# Patient Record
Sex: MALE | Race: ASIAN | NOT HISPANIC OR LATINO | Employment: FULL TIME | URBAN - METROPOLITAN AREA
[De-identification: names, ages, dates, MRNs, and addresses within clinical notes are randomized per-mention and may not be internally consistent; named-entity substitution may affect disease eponyms.]

---

## 2017-04-10 ENCOUNTER — APPOINTMENT (OUTPATIENT)
Dept: ULTRASOUND IMAGING | Facility: CLINIC | Age: 52
End: 2017-04-10
Attending: EMERGENCY MEDICINE
Payer: COMMERCIAL

## 2017-04-10 ENCOUNTER — APPOINTMENT (OUTPATIENT)
Dept: CT IMAGING | Facility: CLINIC | Age: 52
End: 2017-04-10
Attending: EMERGENCY MEDICINE
Payer: COMMERCIAL

## 2017-04-10 ENCOUNTER — HOSPITAL ENCOUNTER (EMERGENCY)
Facility: CLINIC | Age: 52
Discharge: HOME | End: 2017-04-10
Attending: EMERGENCY MEDICINE | Admitting: EMERGENCY MEDICINE
Payer: COMMERCIAL

## 2017-04-10 VITALS
RESPIRATION RATE: 16 BRPM | DIASTOLIC BLOOD PRESSURE: 95 MMHG | HEART RATE: 85 BPM | OXYGEN SATURATION: 95 % | SYSTOLIC BLOOD PRESSURE: 142 MMHG | TEMPERATURE: 98.4 F

## 2017-04-10 DIAGNOSIS — R10.13 ABDOMINAL PAIN, EPIGASTRIC: ICD-10-CM

## 2017-04-10 DIAGNOSIS — F10.10 ALCOHOL CONSUMPTION BINGE DRINKING: ICD-10-CM

## 2017-04-10 DIAGNOSIS — R11.2 NON-INTRACTABLE VOMITING WITH NAUSEA, UNSPECIFIED VOMITING TYPE: ICD-10-CM

## 2017-04-10 DIAGNOSIS — R31.29 MICROSCOPIC HEMATURIA: ICD-10-CM

## 2017-04-10 LAB
ALBUMIN SERPL-MCNC: 3.9 G/DL (ref 3.4–5)
ALBUMIN UR-MCNC: NEGATIVE MG/DL
ALP SERPL-CCNC: 81 U/L (ref 40–150)
ALT SERPL W P-5'-P-CCNC: 72 U/L (ref 0–70)
ANION GAP SERPL CALCULATED.3IONS-SCNC: 10 MMOL/L (ref 3–14)
APPEARANCE UR: CLEAR
AST SERPL W P-5'-P-CCNC: 34 U/L (ref 0–45)
BACTERIA #/AREA URNS HPF: ABNORMAL /HPF
BASOPHILS # BLD AUTO: 0 10E9/L (ref 0–0.2)
BASOPHILS NFR BLD AUTO: 0.2 %
BILIRUB SERPL-MCNC: 0.5 MG/DL (ref 0.2–1.3)
BILIRUB UR QL STRIP: NEGATIVE
BUN SERPL-MCNC: 21 MG/DL (ref 7–30)
CALCIUM SERPL-MCNC: 8 MG/DL (ref 8.5–10.1)
CHLORIDE SERPL-SCNC: 113 MMOL/L (ref 94–109)
CO2 SERPL-SCNC: 20 MMOL/L (ref 20–32)
COLOR UR AUTO: COLORLESS
CREAT SERPL-MCNC: 1.23 MG/DL (ref 0.66–1.25)
DIFFERENTIAL METHOD BLD: ABNORMAL
EOSINOPHIL # BLD AUTO: 0.1 10E9/L (ref 0–0.7)
EOSINOPHIL NFR BLD AUTO: 0.4 %
ERYTHROCYTE [DISTWIDTH] IN BLOOD BY AUTOMATED COUNT: 13 % (ref 10–15)
GFR SERPL CREATININE-BSD FRML MDRD: 62 ML/MIN/1.7M2
GLUCOSE SERPL-MCNC: 153 MG/DL (ref 70–99)
GLUCOSE UR STRIP-MCNC: NEGATIVE MG/DL
HCT VFR BLD AUTO: 44.8 % (ref 40–53)
HGB BLD-MCNC: 15.1 G/DL (ref 13.3–17.7)
HGB UR QL STRIP: ABNORMAL
IMM GRANULOCYTES # BLD: 0.1 10E9/L (ref 0–0.4)
IMM GRANULOCYTES NFR BLD: 0.3 %
INTERPRETATION ECG - MUSE: NORMAL
KETONES UR STRIP-MCNC: NEGATIVE MG/DL
LACTATE BLD-SCNC: 1.8 MMOL/L (ref 0.7–2.1)
LEUKOCYTE ESTERASE UR QL STRIP: NEGATIVE
LIPASE SERPL-CCNC: 157 U/L (ref 73–393)
LYMPHOCYTES # BLD AUTO: 1.8 10E9/L (ref 0.8–5.3)
LYMPHOCYTES NFR BLD AUTO: 12.4 %
MCH RBC QN AUTO: 31.3 PG (ref 26.5–33)
MCHC RBC AUTO-ENTMCNC: 33.7 G/DL (ref 31.5–36.5)
MCV RBC AUTO: 93 FL (ref 78–100)
MONOCYTES # BLD AUTO: 1.2 10E9/L (ref 0–1.3)
MONOCYTES NFR BLD AUTO: 7.9 %
MUCOUS THREADS #/AREA URNS LPF: PRESENT /LPF
NEUTROPHILS # BLD AUTO: 11.4 10E9/L (ref 1.6–8.3)
NEUTROPHILS NFR BLD AUTO: 78.8 %
NITRATE UR QL: NEGATIVE
NRBC # BLD AUTO: 0 10*3/UL
NRBC BLD AUTO-RTO: 0 /100
PH UR STRIP: 5 PH (ref 5–7)
PLATELET # BLD AUTO: 122 10E9/L (ref 150–450)
POTASSIUM SERPL-SCNC: 4 MMOL/L (ref 3.4–5.3)
PROT SERPL-MCNC: 6.9 G/DL (ref 6.8–8.8)
RBC # BLD AUTO: 4.82 10E12/L (ref 4.4–5.9)
RBC #/AREA URNS AUTO: 11 /HPF (ref 0–2)
SODIUM SERPL-SCNC: 143 MMOL/L (ref 133–144)
SP GR UR STRIP: 1.02 (ref 1–1.03)
URN SPEC COLLECT METH UR: ABNORMAL
UROBILINOGEN UR STRIP-MCNC: 0 MG/DL (ref 0–2)
WBC # BLD AUTO: 14.5 10E9/L (ref 4–11)
WBC #/AREA URNS AUTO: 1 /HPF (ref 0–2)

## 2017-04-10 PROCEDURE — 96375 TX/PRO/DX INJ NEW DRUG ADDON: CPT

## 2017-04-10 PROCEDURE — 25500064 ZZH RX 255 OP 636: Performed by: EMERGENCY MEDICINE

## 2017-04-10 PROCEDURE — 96374 THER/PROPH/DIAG INJ IV PUSH: CPT | Mod: 59

## 2017-04-10 PROCEDURE — 96376 TX/PRO/DX INJ SAME DRUG ADON: CPT

## 2017-04-10 PROCEDURE — 80053 COMPREHEN METABOLIC PANEL: CPT | Performed by: EMERGENCY MEDICINE

## 2017-04-10 PROCEDURE — 83690 ASSAY OF LIPASE: CPT | Performed by: EMERGENCY MEDICINE

## 2017-04-10 PROCEDURE — 36415 COLL VENOUS BLD VENIPUNCTURE: CPT | Performed by: EMERGENCY MEDICINE

## 2017-04-10 PROCEDURE — 83605 ASSAY OF LACTIC ACID: CPT | Performed by: EMERGENCY MEDICINE

## 2017-04-10 PROCEDURE — 96361 HYDRATE IV INFUSION ADD-ON: CPT

## 2017-04-10 PROCEDURE — 85025 COMPLETE CBC W/AUTO DIFF WBC: CPT | Performed by: EMERGENCY MEDICINE

## 2017-04-10 PROCEDURE — 81001 URINALYSIS AUTO W/SCOPE: CPT | Performed by: EMERGENCY MEDICINE

## 2017-04-10 PROCEDURE — 74177 CT ABD & PELVIS W/CONTRAST: CPT

## 2017-04-10 PROCEDURE — 99285 EMERGENCY DEPT VISIT HI MDM: CPT | Mod: 25

## 2017-04-10 PROCEDURE — 25000128 H RX IP 250 OP 636: Performed by: EMERGENCY MEDICINE

## 2017-04-10 PROCEDURE — 76705 ECHO EXAM OF ABDOMEN: CPT

## 2017-04-10 RX ORDER — LIDOCAINE 40 MG/G
CREAM TOPICAL
Status: DISCONTINUED | OUTPATIENT
Start: 2017-04-10 | End: 2017-04-10 | Stop reason: HOSPADM

## 2017-04-10 RX ORDER — IOPAMIDOL 755 MG/ML
500 INJECTION, SOLUTION INTRAVASCULAR ONCE
Status: COMPLETED | OUTPATIENT
Start: 2017-04-10 | End: 2017-04-10

## 2017-04-10 RX ORDER — ONDANSETRON 2 MG/ML
4 INJECTION INTRAMUSCULAR; INTRAVENOUS EVERY 30 MIN PRN
Status: DISCONTINUED | OUTPATIENT
Start: 2017-04-10 | End: 2017-04-10 | Stop reason: HOSPADM

## 2017-04-10 RX ORDER — SODIUM CHLORIDE 9 MG/ML
1000 INJECTION, SOLUTION INTRAVENOUS CONTINUOUS
Status: DISCONTINUED | OUTPATIENT
Start: 2017-04-10 | End: 2017-04-10 | Stop reason: HOSPADM

## 2017-04-10 RX ORDER — ONDANSETRON 4 MG/1
4 TABLET, ORALLY DISINTEGRATING ORAL EVERY 8 HOURS PRN
Qty: 10 TABLET | Refills: 0 | Status: SHIPPED | OUTPATIENT
Start: 2017-04-10 | End: 2017-04-13

## 2017-04-10 RX ORDER — HYDROMORPHONE HYDROCHLORIDE 1 MG/ML
0.5 INJECTION, SOLUTION INTRAMUSCULAR; INTRAVENOUS; SUBCUTANEOUS
Status: DISCONTINUED | OUTPATIENT
Start: 2017-04-10 | End: 2017-04-10 | Stop reason: HOSPADM

## 2017-04-10 RX ORDER — HYDROCODONE BITARTRATE AND ACETAMINOPHEN 5; 325 MG/1; MG/1
1-2 TABLET ORAL EVERY 4 HOURS PRN
Qty: 15 TABLET | Refills: 0 | Status: SHIPPED | OUTPATIENT
Start: 2017-04-10

## 2017-04-10 RX ADMIN — SODIUM CHLORIDE 1000 ML: 9 INJECTION, SOLUTION INTRAVENOUS at 12:50

## 2017-04-10 RX ADMIN — SODIUM CHLORIDE 63 ML: 9 INJECTION, SOLUTION INTRAVENOUS at 15:37

## 2017-04-10 RX ADMIN — IOPAMIDOL 93 ML: 755 INJECTION, SOLUTION INTRAVENOUS at 15:37

## 2017-04-10 RX ADMIN — HYDROMORPHONE HYDROCHLORIDE 0.5 MG: 1 INJECTION, SOLUTION INTRAMUSCULAR; INTRAVENOUS; SUBCUTANEOUS at 15:57

## 2017-04-10 RX ADMIN — HYDROMORPHONE HYDROCHLORIDE 0.5 MG: 1 INJECTION, SOLUTION INTRAMUSCULAR; INTRAVENOUS; SUBCUTANEOUS at 12:50

## 2017-04-10 RX ADMIN — ONDANSETRON 4 MG: 2 INJECTION INTRAMUSCULAR; INTRAVENOUS at 12:51

## 2017-04-10 ASSESSMENT — ENCOUNTER SYMPTOMS
DIARRHEA: 1
ABDOMINAL PAIN: 1
NAUSEA: 1
SHORTNESS OF BREATH: 0
VOMITING: 0
BLOOD IN STOOL: 0

## 2017-04-10 NOTE — LETTER
[facility] EMERGENCY DEPARTMENT  [address]  [facility] [address]  [phone]    April 10, 2017    Ziggy Lauren Sr.  [id number] [address] [facility] [address]  [phone] (home) none (work)    : 1965      To Whom it may concern:    Ziggy Lauren was seen in our Emergency Department today, April 10, 2017.  I expect his condition to improve over the next 1-2 days.  He may return to work/school when improved.    Sincerely,        Belkis Greco

## 2017-04-10 NOTE — ED AVS SNAPSHOT
[facility] Emergency Department    [address]    [facility] [address]    Phone:  [phone]    Fax:  [phone]                                       Ziggy Lauren Sr.   MRN: [id number]    Department:  [facility] Emergency Department   Date of Visit:  4/10/2017           After Visit Summary Signature Page     I have received my discharge instructions, and my questions have been answered. I have discussed any challenges I see with this plan with the nurse or doctor.    ..........................................................................................................................................  Patient/Patient Representative Signature      ..........................................................................................................................................  Patient Representative Print Name and Relationship to Patient    ..................................................               ................................................  Date                                            Time    ..........................................................................................................................................  Reviewed by Signature/Title    ...................................................              ..............................................  Date                                                            Time

## 2017-04-10 NOTE — ED PROVIDER NOTES
History     Chief Complaint:  Abdominal Pain      ROB Lauren Sr. is a 52 year old male who presents with abdominal pain. The patient reports this afternoon the patient developed upper abdominal pain which progressively worsened after eating corn beef followed by nausea and non bloody/black vomiting. He was concerned given his symptoms which prompts his visit. No history of gastritis, pancreatitis, or The patient denies unusual or change in diet. Patient denies black/bloody stool. He has had some diarrhea today. No chest pain, shortness of breath, or any associated symptoms. Patient drinks heavily on the weekends including last night but denies withdrawal symptoms when not drinking alcohol. He denies regular NSAID use. He otherwise was feeling fine upon waking up this morning but has noticed intermittent minor nausea when waking up for quite some time     Allergies:  The patient has no known allergies to medications.      Medications:    Metformin  Lisinopril  Atorvastatin    Past Medical History:    CAD  HTN  Diabetes  Fatty liver    Past Surgical History:    Abdomen surgery    Family History:    The patient has no pertinent family history.    Social History:  .  The patient is a former smoker.  The patient consumes alcohol.      Review of Systems   Respiratory: Negative for shortness of breath.    Cardiovascular: Negative for chest pain.   Gastrointestinal: Positive for abdominal pain, diarrhea and nausea. Negative for blood in stool and vomiting.   All other systems reviewed and are negative.      Physical Exam   First Vitals:  BP: (!) 161/117  Pulse: 85  Temp: 98.4  F (36.9  C)  Resp: 18  SpO2: 96 %    Physical Exam  General: Uncomfortable appearing male  Eyes: PERRL, Conjunctive within normal limits. No scleral icterus.  ENT: Moist mucous membranes, oropharynx clear.   CV: Normal S1S2, no murmur, rub or gallop. Regular rate and rhythm  Resp: Clear to auscultation bilaterally, no wheezes,  rales or rhonchi. Normal respiratory effort.  GI:  No palpable masses. No rebound or guarding. Epigastric tenderness. Abdominal distention.  MSK: No edema. Nontender. Normal active range of motion.  Skin: Warm and dry. No rashes or lesions or ecchymoses on visible skin.  Neuro: Alert and oriented. Responds appropriately to all questions and commands. No focal findings appreciated. Normal muscle tone.  Psych: Normal mood and affect. Pleasant.    Emergency Department Course   ECG:  Completed at 1237.  Read at 1244.   Rate 77 bpm. CT interval 106. QRS duration 176. QT/QTc 490/554. P-R-T axes 76 -51 154. Sinus rhythm with short CT, left axis deviation, left ventricular hypertrophy with QRS widening and repolarization abnormality, inferior infarct, abnormal ECG     Imaging:  Radiographic findings were communicated with the patient who voiced understanding of the findings.    CT Abdomen Pelvis w Contrast:   1. No definite cause for abdominal pain identified.  2. There is subtle bilaterally symmetric perinephric fluid without  hydronephrosis. Uncertain significance, clinical correlation regarding  urinalysis and possible mild pyelonephritis.  3. Innumerable tiny indeterminate nodular densities in the  subcutaneous fat. Differential diagnosis includes malignancy.  4. Indeterminate tiny 0.3 cm right middle lobe lung nodule and 1 cm  nodular node at the level of the diaphragm anterior to the heart.  5. No convincing acute bowel abnormality. Equivocal for minimal bowel  wall thickening versus underdistention artifact involving rectosigmoid  junction. Favor underdistention artifact.  Results per radiology.     US Abdomen Limited RUQ:   Liver echogenicity suspicious for fatty infiltration.  Nonvisualized pancreas. Otherwise normal right upper quadrant  ultrasound. No gallstones.  Results per radiology.     Laboratory:  CBC: WBC 14.5 (H),  (L), ow wnl (HGB 15.1)  CMP:  (H),  (H), Calc 8.0 (L), Alt 72 (H), ow  wnl (Creat 1.23)  UA with Micro: Blood moderate, RBC 11 (H), Bacteria Few, Mucous present, ow wnl  Lactic acid: 1.8  Lipase: 157    Interventions:  0.9% sodium chloride infusion 1000 mL  Dilaudid 0.5 mg IV, x2  Zofran 4 mg IV    Emergency Department Course:  Nursing notes and vitals reviewed.  I performed an exam of the patient as documented above.     The work up above was undertaken.     The patient received the intervention(s) above.     Patient reassessed after US. Reports improvement in symptoms after pain medication, although still there. On palpation, his abdomen is more diffusely tender with peritoneal signs.    Patient reassessed after CT scan. Reports hunger.     Patient eating and drinking. Denies significant pain.    Findings and plan explained to the Patient. Patient discharged home with instructions regarding supportive care, medications, and reasons to return. The importance of close follow-up was reviewed.     Impression & Plan    Medical Decision Making:  Ziggy Lauren Sr. is a 52 year old male with a history of binge alcohol drinking who presents to the emergency department with concerns for epigastric abdominal pain and vomiting after heavily drinking last night. He has no abnormality found on ultrasound or CT scan to fully explain symptoms. No evidence of UTI. Mild leukocytosis but no other worrisome lab abnormalities. His symptoms improved over his stay here. I suspect alcohol induced gastritis given lack of findings to suggest other life threatening or surgical etiologies such as cholecystitis, rapidly bleeding ulcer, bowel obstruction. Microscopic hematuria was present in his urine but I do not suspect kidney stone and UA not consistent with UTI, therefore not pyelonephritis. The microscopic hematuria can be reassessed with repeat UA when he is improved. Follow up in 2-3 days for reassessment. Zofran as needed for nausea vomiting, Vicodin as needed for pain. I recommended Prilosec daily. Use  of Tums intermittently.  All questions were answered and he was discharged home in improved condition.      Diagnosis:    ICD-10-CM    1. Abdominal pain, epigastric R10.13    2. Non-intractable vomiting with nausea, unspecified vomiting type R11.2    3. Microscopic hematuria R31.29    4. Alcohol consumption binge drinking F10.10        Disposition:  Discharged.    Discharge Medications:  Discharge Medication List as of 4/10/2017  5:39 PM      START taking these medications    Details   omeprazole (PRILOSEC) 20 MG CR capsule Take 1 capsule (20 mg) by mouth daily, Disp-30 capsule, R-0, Local Print      HYDROcodone-acetaminophen (NORCO) 5-325 MG per tablet Take 1-2 tablets by mouth every 4 hours as needed for moderate to severe pain, Disp-15 tablet, R-0, Local Print      ondansetron (ZOFRAN ODT) 4 MG ODT tab Take 1 tablet (4 mg) by mouth every 8 hours as needed for nausea or vomiting, Disp-10 tablet, R-0, Local Print               Howard BOTELLO, neftaly serving as a scribe at 1:52 PM on 4/10/2017 to document services personally performed by Dr. Brice, based on my observations and the provider's statements to me.    [facility] EMERGENCY DEPARTMENT       Leslie Brice MD  04/12/17 [id number]

## 2017-04-10 NOTE — ED NOTES
Presents to the ED with a sudden onset of upper abdominal pain and vomiting. Pain began after eating.

## 2017-04-10 NOTE — DISCHARGE INSTRUCTIONS
Discharge Instructions  Abdominal Pain    Abdominal pain can be caused by many things. Your evaluation today does not show the exact cause for your pain. Your doctor today has decided that it is unlikely your pain is due to a life threatening problem, or a problem requiring surgery or hospital admission. Sometimes those problems cannot be found right away, so it is very important that you follow up as directed.  Sometimes only the changes which occur over time allow the cause of your pain to be found.    Return to the Emergency Department for a recheck in 8-12 hours if your pain continues.  If your pain gets worse, changes in location, or feels different, return to the Emergency Department right away.    ADULTS:  Return to the Emergency Department right away if:      You get an oral temperature above 102oF or as directed by your doctor.    You have blood in your stools (bright red or black, tarry stools).    You keep throwing up or can t drink liquids.    You see blood when you throw up.    You can t have a bowel movement or you can t pass gas.    Your stomach gets bloated or bigger.    Your skin or the whites of your eyes look yellow.    You faint.    You have bloody, frequent or painful urination.    You have new symptoms or anything that worries you.    CHILDREN:  Return to the Emergency Department right away if your child has any of the above-listed symptoms or the following:      Pushes your hand away or screams/cries when his/her belly is touched.    You notice your child is very fussy or weak.    Your child is very tired and is too tired to eat or drink.    Your child is dehydrated.  Signs of dehydration can be:  o Your infant has had no wet diapers in 4-5 hours.  o Your older child has not passed urine in 6-8 hours.  o Your infant or child starts to have dry mouth and lips, or no saliva or tears.    PREGNANT WOMEN:  Return to the Emergency Department right away if you have any of the above-listed symptoms or  the following:      You have bleeding, leaking fluid or passing tissue from the vagina.    You have worse pain or cramping, or pain in your shoulder or back.    You have vomiting that will not stop.    You have painful or bloody urination.    You have a temperature of 100oF or more.    Your baby is not moving as much as usual.    You faint.    You get a bad headache with or without eye problems and abdominal pain.    You have a convulsion or seizure.    You have unusual discharge from your vagina and abdominal pain.    Abdominal pain is pretty common during pregnancy.  Your pain may or may not be related to your pregnancy. You should follow-up closely with your OB doctor so they can evaluate you and your baby.  Until you follow-up with your regular doctor, do the following:       Avoid sex and do not put anything in your vagina.    Drink clear fluids.    Only take medications approved by your doctor.    MORE INFORMATION:    Appendicitis:  A possible cause of abdominal pain in any person who still has their appendix is acute appendicitis. Appendicitis is often hard to diagnose.  Testing does not always rule out early appendicitis or other causes of abdominal pain. Close follow-up with your doctor and re-evaluations may be needed to figure out the reason for your abdominal pain.    Follow-up:  It is very important that you make an appointment with your clinic and go to the appointment.  If you do not follow-up with your primary doctor, it may result in missing an important development which could result in permanent injury or disability and/or lasting pain.  If there is any problem keeping your appointment, call your doctor or return to the Emergency Department.    Medications:  Take your medications as directed by your doctor today.  Before using over-the-counter medications, ask your doctor and make sure to take the medications as directed.  If you have any questions about medications, ask your doctor.    Diet:   "Resume your normal diet as much as possible, but do not eat fried, fatty or spicy foods while you have pain.  Do not drink alcohol or have caffeine.  Do not smoke tobacco.    Probiotics: If you have been given an antibiotic, you may want to also take a probiotic pill or eat yogurt with live cultures. Probiotics have \"good bacteria\" to help your intestines stay healthy. Studies have shown that probiotics help prevent diarrhea and other intestine problems (including C. diff infection) when you take antibiotics. You can buy these without a prescription in the pharmacy section of the store.     If you were given a prescription for medicine here today, be sure to read all of the information (including the package insert) that comes with your prescription.  This will include important information about the medicine, its side effects, and any warnings that you need to know about.  The pharmacist who fills the prescription can provide more information and answer questions you may have about the medicine.  If you have questions or concerns that the pharmacist cannot address, please call or return to the Emergency Department.         Opioid Medication Information    Pain medications are among the most commonly prescribed medicines, so we are including this information for all our patients. If you did not receive pain medication or get a prescription for pain medicine, you can ignore it.     You may have been given a prescription for an opioid (narcotic) pain medicine and/or have received a pain medicine while here in the Emergency Department. These medicines can make you drowsy or impaired. You must not drive, operate dangerous equipment, or engage in any other dangerous activities while taking these medications. If you drive while taking these medications, you could be arrested for DUI, or driving under the influence. Do not drink any alcohol while you are taking these medications.     Opioid pain medications can cause " addiction. If you have a history of chemical dependency of any type, you are at a higher risk of becoming addicted to pain medications.  Only take these prescribed medications to treat your pain when all other options have been tried. Take it for as short a time and as few doses as possible. Store your pain pills in a secure place, as they are frequently stolen and provide a dangerous opportunity for children or visitors in your house to start abusing these powerful medications. We will not replace any lost or stolen medicine.  As soon as your pain is better, you should flush all your remaining medication.     Many prescription pain medications contain Tylenol  (acetaminophen), including Vicodin , Tylenol #3 , Norco , Lortab , and Percocet .  You should not take any extra pills of Tylenol  if you are using these prescription medications or you can get very sick.  Do not ever take more than 3000 mg of acetaminophen in any 24 hour period.    All opioids tend to cause constipation. Drink plenty of water and eat foods that have a lot of fiber, such as fruits, vegetables, prune juice, apple juice and high fiber cereal.  Take a laxative if you don t move your bowels at least every other day. Miralax , Milk of Magnesia, Colace , or Senna  can be used to keep you regular.      Remember that you can always come back to the Emergency Department if you are not able to see your regular doctor in the amount of time listed above, if you get any new symptoms, or if there is anything that worries you.

## 2017-04-10 NOTE — ED AVS SNAPSHOT
[facility] Emergency Department    [address]    [facility] [address]    Phone:  [phone]    Fax:  [phone]                                       Ziggy Lauren Sr.   MRN: [id number]    Department:  [facility] Emergency Department   Date of Visit:  4/10/2017           Patient Information     Date Of Birth          1965        Your diagnoses for this visit were:     Abdominal pain, epigastric     Non-intractable vomiting with nausea, unspecified vomiting type     Microscopic hematuria     Alcohol consumption binge drinking        You were seen by Leslie Brice MD.      Follow-up Information     Follow up with Art Watson MD.    Specialty:  Family Practice    Why:  within 3 days for reassessment    Contact information:    Critical access hospital  [id number] [facility] [address] [phone]          Follow up with [facility] Emergency Department.    Specialty:  EMERGENCY MEDICINE    Why:  As needed, If symptoms worsen    Contact information:    [address]  [facility] [id number] [phone]        Discharge Instructions       Discharge Instructions  Abdominal Pain    Abdominal pain can be caused by many things. Your evaluation today does not show the exact cause for your pain. Your doctor today has decided that it is unlikely your pain is due to a life threatening problem, or a problem requiring surgery or hospital admission. Sometimes those problems cannot be found right away, so it is very important that you follow up as directed.  Sometimes only the changes which occur over time allow the cause of your pain to be found.    Return to the Emergency Department for a recheck in 8-12 hours if your pain continues.  If your pain gets worse, changes in location, or feels different, return to the Emergency Department right away.    ADULTS:  Return to the Emergency Department right away if:      You get an  oral temperature above 102oF or as directed by your doctor.    You have blood in your stools (bright red or black, tarry stools).    You keep throwing up or can t drink liquids.    You see blood when you throw up.    You can t have a bowel movement or you can t pass gas.    Your stomach gets bloated or bigger.    Your skin or the whites of your eyes look yellow.    You faint.    You have bloody, frequent or painful urination.    You have new symptoms or anything that worries you.    CHILDREN:  Return to the Emergency Department right away if your child has any of the above-listed symptoms or the following:      Pushes your hand away or screams/cries when his/her belly is touched.    You notice your child is very fussy or weak.    Your child is very tired and is too tired to eat or drink.    Your child is dehydrated.  Signs of dehydration can be:  o Your infant has had no wet diapers in 4-5 hours.  o Your older child has not passed urine in 6-8 hours.  o Your infant or child starts to have dry mouth and lips, or no saliva or tears.    PREGNANT WOMEN:  Return to the Emergency Department right away if you have any of the above-listed symptoms or the following:      You have bleeding, leaking fluid or passing tissue from the vagina.    You have worse pain or cramping, or pain in your shoulder or back.    You have vomiting that will not stop.    You have painful or bloody urination.    You have a temperature of 100oF or more.    Your baby is not moving as much as usual.    You faint.    You get a bad headache with or without eye problems and abdominal pain.    You have a convulsion or seizure.    You have unusual discharge from your vagina and abdominal pain.    Abdominal pain is pretty common during pregnancy.  Your pain may or may not be related to your pregnancy. You should follow-up closely with your OB doctor so they can evaluate you and your baby.  Until you follow-up with your regular doctor, do the following:  "      Avoid sex and do not put anything in your vagina.    Drink clear fluids.    Only take medications approved by your doctor.    MORE INFORMATION:    Appendicitis:  A possible cause of abdominal pain in any person who still has their appendix is acute appendicitis. Appendicitis is often hard to diagnose.  Testing does not always rule out early appendicitis or other causes of abdominal pain. Close follow-up with your doctor and re-evaluations may be needed to figure out the reason for your abdominal pain.    Follow-up:  It is very important that you make an appointment with your clinic and go to the appointment.  If you do not follow-up with your primary doctor, it may result in missing an important development which could result in permanent injury or disability and/or lasting pain.  If there is any problem keeping your appointment, call your doctor or return to the Emergency Department.    Medications:  Take your medications as directed by your doctor today.  Before using over-the-counter medications, ask your doctor and make sure to take the medications as directed.  If you have any questions about medications, ask your doctor.    Diet:  Resume your normal diet as much as possible, but do not eat fried, fatty or spicy foods while you have pain.  Do not drink alcohol or have caffeine.  Do not smoke tobacco.    Probiotics: If you have been given an antibiotic, you may want to also take a probiotic pill or eat yogurt with live cultures. Probiotics have \"good bacteria\" to help your intestines stay healthy. Studies have shown that probiotics help prevent diarrhea and other intestine problems (including C. diff infection) when you take antibiotics. You can buy these without a prescription in the pharmacy section of the store.     If you were given a prescription for medicine here today, be sure to read all of the information (including the package insert) that comes with your prescription.  This will include " important information about the medicine, its side effects, and any warnings that you need to know about.  The pharmacist who fills the prescription can provide more information and answer questions you may have about the medicine.  If you have questions or concerns that the pharmacist cannot address, please call or return to the Emergency Department.         Opioid Medication Information    Pain medications are among the most commonly prescribed medicines, so we are including this information for all our patients. If you did not receive pain medication or get a prescription for pain medicine, you can ignore it.     You may have been given a prescription for an opioid (narcotic) pain medicine and/or have received a pain medicine while here in the Emergency Department. These medicines can make you drowsy or impaired. You must not drive, operate dangerous equipment, or engage in any other dangerous activities while taking these medications. If you drive while taking these medications, you could be arrested for DUI, or driving under the influence. Do not drink any alcohol while you are taking these medications.     Opioid pain medications can cause addiction. If you have a history of chemical dependency of any type, you are at a higher risk of becoming addicted to pain medications.  Only take these prescribed medications to treat your pain when all other options have been tried. Take it for as short a time and as few doses as possible. Store your pain pills in a secure place, as they are frequently stolen and provide a dangerous opportunity for children or visitors in your house to start abusing these powerful medications. We will not replace any lost or stolen medicine.  As soon as your pain is better, you should flush all your remaining medication.     Many prescription pain medications contain Tylenol  (acetaminophen), including Vicodin , Tylenol #3 , Norco , Lortab , and Percocet .  You should not take any extra  pills of Tylenol  if you are using these prescription medications or you can get very sick.  Do not ever take more than 3000 mg of acetaminophen in any 24 hour period.    All opioids tend to cause constipation. Drink plenty of water and eat foods that have a lot of fiber, such as fruits, vegetables, prune juice, apple juice and high fiber cereal.  Take a laxative if you don t move your bowels at least every other day. Miralax , Milk of Magnesia, Colace , or Senna  can be used to keep you regular.      Remember that you can always come back to the Emergency Department if you are not able to see your regular doctor in the amount of time listed above, if you get any new symptoms, or if there is anything that worries you.        Discharge References/Attachments     GASTRITIS OR ULCER (NO ANTIBIOTIC TREATMENT) (ENGLISH)    HEMATURIA (ENGLISH)      24 Hour Appointment Hotline       To make an appointment at any [facility], call [phone] ([phone]). If you don't have a family doctor or clinic, we will help you find one. [facility] clinics are conveniently located to serve the needs of you and your family.             Review of your medicines      START taking        Dose / Directions Last dose taken    HYDROcodone-acetaminophen 5-325 MG per tablet   Commonly known as:  NORCO   Dose:  1-2 tablet   Quantity:  15 tablet        Take 1-2 tablets by mouth every 4 hours as needed for moderate to severe pain   Refills:  0        omeprazole 20 MG CR capsule   Commonly known as:  priLOSEC   Dose:  20 mg   Quantity:  30 capsule        Take 1 capsule (20 mg) by mouth daily   Refills:  0        ondansetron 4 MG ODT tab   Commonly known as:  ZOFRAN ODT   Dose:  4 mg   Quantity:  10 tablet        Take 1 tablet (4 mg) by mouth every 8 hours as needed for nausea or vomiting   Refills:  0          Our records show that you are taking the medicines listed below. If these are incorrect, please call your family doctor or clinic.         Dose / Directions Last dose taken    ATORVASTATIN CALCIUM PO   Dose:  80 mg        Take 80 mg by mouth daily   Refills:  0        LISINOPRIL PO   Dose:  20 mg        Take 20 mg by mouth daily   Refills:  0        METFORMIN HCL PO   Dose:  500 mg        Take 500 mg by mouth 2 times daily (with meals)   Refills:  0                Prescriptions were sent or printed at these locations (3 Prescriptions)                   Other Prescriptions                Printed at Department/Unit printer (3 of 3)         omeprazole (PRILOSEC) 20 MG CR capsule               HYDROcodone-acetaminophen (NORCO) 5-325 MG per tablet               ondansetron (ZOFRAN ODT) 4 MG ODT tab                Procedures and tests performed during your visit     Abdomen US, limited (RUQ only)    CBC with platelets differential    CT Abdomen Pelvis w Contrast    Cardiac Continuous Monitoring    Comprehensive metabolic panel    EKG 12-lead, tracing only    Lactic acid whole blood    Lipase    Peripheral IV catheter    Pulse oximetry nursing    UA with Microscopic      Orders Needing Specimen Collection     None      Pending Results     No orders found from 4/8/2017 to 4/11/2017.            Pending Culture Results     No orders found from 4/8/2017 to 4/11/2017.            Test Results From Your Hospital Stay        4/10/2017  1:01 PM      Component Results     Component Value Ref Range & Units Status    WBC 14.5 (H) 4.0 - 11.0 10e9/L Final    RBC Count 4.82 4.4 - 5.9 10e12/L Final    Hemoglobin 15.1 13.3 - 17.7 g/dL Final    Hematocrit 44.8 40.0 - 53.0 % Final    MCV 93 78 - 100 fl Final    MCH 31.3 26.5 - 33.0 pg Final    MCHC 33.7 31.5 - 36.5 g/dL Final    RDW 13.0 10.0 - 15.0 % Final    Platelet Count 122 (L) 150 - 450 10e9/L Final    Diff Method Automated Method  Final    % Neutrophils 78.8 % Final    % Lymphocytes 12.4 % Final    % Monocytes 7.9 % Final    % Eosinophils 0.4 % Final    % Basophils 0.2 % Final    % Immature Granulocytes 0.3 %  Final    Nucleated RBCs 0 0 /100 Final    Absolute Neutrophil 11.4 (H) 1.6 - 8.3 10e9/L Final    Absolute Lymphocytes 1.8 0.8 - 5.3 10e9/L Final    Absolute Monocytes 1.2 0.0 - 1.3 10e9/L Final    Absolute Eosinophils 0.1 0.0 - 0.7 10e9/L Final    Absolute Basophils 0.0 0.0 - 0.2 10e9/L Final    Abs Immature Granulocytes 0.1 0 - 0.4 10e9/L Final    Absolute Nucleated RBC 0.0  Final         4/10/2017  1:19 PM      Component Results     Component Value Ref Range & Units Status    Sodium 143 133 - 144 mmol/L Final    Potassium 4.0 3.4 - 5.3 mmol/L Final    Chloride 113 (H) 94 - 109 mmol/L Final    Carbon Dioxide 20 20 - 32 mmol/L Final    Anion Gap 10 3 - 14 mmol/L Final    Glucose 153 (H) 70 - 99 mg/dL Final    Urea Nitrogen 21 7 - 30 mg/dL Final    Creatinine 1.23 0.66 - 1.25 mg/dL Final    GFR Estimate 62 >60 mL/min/1.7m2 Final    Non  GFR Calc    GFR Estimate If Black 74 >60 mL/min/1.7m2 Final    African American GFR Calc    Calcium 8.0 (L) 8.5 - 10.1 mg/dL Final    Bilirubin Total 0.5 0.2 - 1.3 mg/dL Final    Albumin 3.9 3.4 - 5.0 g/dL Final    Protein Total 6.9 6.8 - 8.8 g/dL Final    Alkaline Phosphatase 81 40 - 150 U/L Final    ALT 72 (H) 0 - 70 U/L Final    AST 34 0 - 45 U/L Final         4/10/2017  1:19 PM      Component Results     Component Value Ref Range & Units Status    Lipase 157 73 - 393 U/L Final         4/10/2017  2:08 PM      Narrative     RIGHT UPPER QUADRANT ULTRASOUND 4/10/2017 1:50 PM    HISTORY:  Upper abdomen pain    COMPARISON: None.    FINDINGS:    Gallbladder:  Normal with no cholelithiasis, wall thickening or focal  tenderness.      Bile ducts:   CHD is normal diameter.  No intrahepatic biliary  dilatation. 0.3 cm common hepatic duct    Liver:  Mildly echogenic suspicious for fatty infiltration. No focal  liver lesions identified. 17 cm in length.    Pancreas:  Completely obscured.    Right kidney:  Normal, 10.6 cm in length. No hydronephrosis.        Impression      IMPRESSION:  Liver echogenicity suspicious for fatty infiltration.  Nonvisualized pancreas. Otherwise normal right upper quadrant  ultrasound. No gallstones.    SOUMYA JAIMES MD         4/10/2017  3:36 PM      Component Results     Component Value Ref Range & Units Status    Lactic Acid 1.8 0.7 - 2.1 mmol/L Final         4/10/2017  4:54 PM      Narrative     CT ABDOMEN AND PELVIS WITH CONTRAST 4/10/2017 3:42 PM    TECHNIQUE: Images from diaphragm to pubic symphysis 93mL Isovue-370 IV  contrast  Radiation dose for this scan was reduced using automated exposure  control, adjustment of the mA and/or kV according to patient size, or  iterative reconstruction technique.    HISTORY: Abdominal pain.    COMPARISON: 4/10/2017 right upper quadrant ultrasound.    FINDINGS:   Abdomen and Pelvis: Mild bilateral perinephric fluid and fat  stranding. There is no evidence for hydronephrosis and there is  symmetric bilateral nephrogram enhancement. Equivocal for areas of  subtle heterogeneous cortical enhancement. Clinical correlation for  acute pyelonephritis.    Indeterminate 0.3 cm right middle lobe nodule series 2 image 5. There  is normal-appearing appendix adjacent to the inferior right lobe of  the liver. There is a segment of colon at the rectosigmoid junction  which is questionably thickened versus underdistended on CT series 2  image 68. Doubtful significance. Minimal bowel wall thickening here is  difficult to exclude. Bowel is otherwise unremarkable.    Innumerable tiny nodular densities in the subcutaneous fat of the  anterior abdominal wall and also posterior abdominal wall is  identified which is indeterminate. Clinical correlation for medication  injection but the fact that some of these are identified posteriorly  is atypical for medication injection, occasionally metastasis or  lymphoma could have this appearance. There is a 1 cm soft tissue  nodule or node in the fat anterior to the heart on image 13 in  the  midline. No periaortic or pelvic adenopathy. No aggressive bone  lesions.        Impression     IMPRESSION:   1. No definite cause for abdominal pain identified.  2. There is subtle bilaterally symmetric perinephric fluid without  hydronephrosis. Uncertain significance, clinical correlation regarding  urinalysis and possible mild pyelonephritis.  3. Innumerable tiny indeterminate nodular densities in the  subcutaneous fat. Differential diagnosis includes malignancy.  4. Indeterminate tiny 0.3 cm right middle lobe lung nodule and 1 cm  nodular node at the level of the diaphragm anterior to the heart.  5. No convincing acute bowel abnormality. Equivocal for minimal bowel  wall thickening versus underdistention artifact involving rectosigmoid  junction. Favor underdistention artifact.               SOUMYA JAIMES MD         4/10/2017  5:20 PM      Component Results     Component Value Ref Range & Units Status    Color Urine Colorless  Final    Appearance Urine Clear  Final    Glucose Urine Negative NEG mg/dL Final    Bilirubin Urine Negative NEG Final    Ketones Urine Negative NEG mg/dL Final    Specific Gravity Urine 1.016 1.003 - 1.035 Final    Blood Urine Moderate (A) NEG Final    pH Urine 5.0 5.0 - 7.0 pH Final    Protein Albumin Urine Negative NEG mg/dL Final    Urobilinogen mg/dL 0.0 0.0 - 2.0 mg/dL Final    Nitrite Urine Negative NEG Final    Leukocyte Esterase Urine Negative NEG Final    Source Midstream Urine  Final    WBC Urine 1 0 - 2 /HPF Final    RBC Urine 11 (H) 0 - 2 /HPF Final    Bacteria Urine Few (A) NEG /HPF Final    Mucous Urine Present (A) NEG /LPF Final                Clinical Quality Measure: Blood Pressure Screening     Your blood pressure was checked while you were in the emergency department today. The last reading we obtained was  BP: 118/64 . Please read the guidelines below about what these numbers mean and what you should do about them.  If your systolic blood pressure (the top  "number) is less than 120 and your diastolic blood pressure (the bottom number) is less than 80, then your blood pressure is normal. There is nothing more that you need to do about it.  If your systolic blood pressure (the top number) is 120-139 or your diastolic blood pressure (the bottom number) is 80-89, your blood pressure may be higher than it should be. You should have your blood pressure rechecked within a year by a primary care provider.  If your systolic blood pressure (the top number) is 140 or greater or your diastolic blood pressure (the bottom number) is 90 or greater, you may have high blood pressure. High blood pressure is treatable, but if left untreated over time it can put you at risk for heart attack, stroke, or kidney failure. You should have your blood pressure rechecked by a primary care provider within the next 4 weeks.  If your provider in the emergency department today gave you specific instructions to follow-up with your doctor or provider even sooner than that, you should follow that instruction and not wait for up to 4 weeks for your follow-up visit.        Thank you for choosing [facility]       Thank you for choosing [facility] for your care. Our goal is always to provide you with excellent care. Hearing back from our patients is one way we can continue to improve our services. Please take a few minutes to complete the written survey that you may receive in the mail after you visit with us. Thank you!        [facility] Information     [facility] lets you send messages to your doctor, view your test results, renew your prescriptions, schedule appointments and more. To sign up, go to [email or web address] . Click on \"Log in\" on the left side of the screen, which will take you to the Welcome page. Then click on \"Sign up Now\" on the right side of the page.     You will be asked to enter the access code listed below, as well as some personal information. Please follow the directions to create your " username and password.     Your access code is: [email or web address]  Expires: 2017  5:39 PM     Your access code will  in 90 days. If you need help or a new code, please call your [facility] clinic or [phone].        Care EveryWhere ID     This is your Care EveryWhere ID. This could be used by other organizations to access your [facility] medical records  [id number]        After Visit Summary       This is your record. Keep this with you and show to your community pharmacist(s) and doctor(s) at your next visit.

## 2017-11-11 ENCOUNTER — HOSPITAL ENCOUNTER (EMERGENCY)
Facility: CLINIC | Age: 52
Discharge: HOME | End: 2017-11-11
Attending: EMERGENCY MEDICINE | Admitting: EMERGENCY MEDICINE

## 2017-11-11 ENCOUNTER — APPOINTMENT (OUTPATIENT)
Dept: GENERAL RADIOLOGY | Facility: CLINIC | Age: 52
End: 2017-11-11
Attending: EMERGENCY MEDICINE

## 2017-11-11 VITALS
OXYGEN SATURATION: 99 % | WEIGHT: 210 LBS | DIASTOLIC BLOOD PRESSURE: 111 MMHG | HEART RATE: 90 BPM | RESPIRATION RATE: 18 BRPM | TEMPERATURE: 98 F | BODY MASS INDEX: 34.95 KG/M2 | SYSTOLIC BLOOD PRESSURE: 180 MMHG

## 2017-11-11 DIAGNOSIS — S61.210A LACERATION OF RIGHT INDEX FINGER WITHOUT FOREIGN BODY WITHOUT DAMAGE TO NAIL, INITIAL ENCOUNTER: ICD-10-CM

## 2017-11-11 DIAGNOSIS — I10 HYPERTENSION, UNSPECIFIED TYPE: ICD-10-CM

## 2017-11-11 PROCEDURE — 73140 X-RAY EXAM OF FINGER(S): CPT | Mod: RT

## 2017-11-11 PROCEDURE — 12001 RPR S/N/AX/GEN/TRNK 2.5CM/<: CPT

## 2017-11-11 PROCEDURE — 99283 EMERGENCY DEPT VISIT LOW MDM: CPT

## 2017-11-11 RX ORDER — GINSENG 100 MG
CAPSULE ORAL
Status: DISCONTINUED
Start: 2017-11-11 | End: 2017-11-11 | Stop reason: HOSPADM

## 2017-11-11 ASSESSMENT — ENCOUNTER SYMPTOMS: WOUND: 1

## 2017-11-11 NOTE — ED AVS SNAPSHOT
[facility] Emergency Department    [address]    [facility] [address]    Phone:  [phone]    Fax:  [phone]                                       Ziggy Lauren Sr.   MRN: [id number]    Department:  [facility] Emergency Department   Date of Visit:  11/11/2017           Patient Information     Date Of Birth          1965        Your diagnoses for this visit were:     Laceration of right index finger without foreign body without damage to nail, initial encounter     Hypertension, unspecified type        You were seen by Yumiko Brewer MD.      Follow-up Information     Follow up with Art Watson MD.    Specialty:  Family Practice    Contact information:    [facility]  [id number] [place] [address] [phone]          Discharge Instructions       Watch for redness, drainage, fevers, swelling (sign of infection).    No dunking/swimming.  OK showering.    Stitches out in 5 days.    Bacitracin/neosporin to wound daily.    Inspect daily for signs of infection.    Please take your BP medicines today and recheck BP in the next few days.    Discharge References/Attachments     HIGH BLOOD PRESSURE, CONTROLLING (ENGLISH)    LACERATION, EXTREMITY: STITCHES, STAPLE, OR TAPE (ENGLISH)      24 Hour Appointment Hotline       To make an appointment at any [facility] clinic, call [phone] ([phone]). If you don't have a family doctor or clinic, we will help you find one. [facility] clinics are conveniently located to serve the needs of you and your family.             Review of your medicines      Our records show that you are taking the medicines listed below. If these are incorrect, please call your family doctor or clinic.        Dose / Directions Last dose taken    ATORVASTATIN CALCIUM PO   Dose:  80 mg        Take 80 mg by mouth daily   Refills:  0        HYDROcodone-acetaminophen 5-325 MG per tablet   Commonly known as:   NORCO   Dose:  1-2 tablet   Quantity:  15 tablet        Take 1-2 tablets by mouth every 4 hours as needed for moderate to severe pain   Refills:  0        LISINOPRIL PO   Dose:  20 mg        Take 20 mg by mouth daily   Refills:  0        METFORMIN HCL PO   Dose:  500 mg        Take 500 mg by mouth 2 times daily (with meals)   Refills:  0                Procedures and tests performed during your visit     XR Finger Right G/E 2 Views      Orders Needing Specimen Collection     None      Pending Results     Date and Time Order Name Status Description    11/11/2017 1050 XR Finger Right G/E 2 Views Preliminary             Pending Culture Results     No orders found from 11/9/2017 to 11/12/2017.            Pending Results Instructions     If you had any lab results that were not finalized at the time of your Discharge, you can call the ED Lab Result RN at [phone]. You will be contacted by this team for any positive Lab results or changes in treatment. The nurses are available 7 days a week from 10A to 6:30P.  You can leave a message 24 hours per day and they will return your call.        Test Results From Your Hospital Stay        11/11/2017 11:55 AM      Narrative     FINGER RIGHT TWO OR MORE VIEWS    11/11/2017 11:47 AM     HISTORY: Smash injury    COMPARISON: None.        Impression     IMPRESSION: No evidence of fracture. Bony alignment within normal  limits. Subtle lucency within the soft tissues along the palmar aspect  of the index finger near the DIP joint probably represents soft tissue  injury.                Clinical Quality Measure: Blood Pressure Screening     Your blood pressure was checked while you were in the emergency department today. The last reading we obtained was  BP: (!) 180/111 . Please read the guidelines below about what these numbers mean and what you should do about them.  If your systolic blood pressure (the top number) is less than 120 and your diastolic blood pressure (the bottom  "number) is less than 80, then your blood pressure is normal. There is nothing more that you need to do about it.  If your systolic blood pressure (the top number) is 120-139 or your diastolic blood pressure (the bottom number) is 80-89, your blood pressure may be higher than it should be. You should have your blood pressure rechecked within a year by a primary care provider.  If your systolic blood pressure (the top number) is 140 or greater or your diastolic blood pressure (the bottom number) is 90 or greater, you may have high blood pressure. High blood pressure is treatable, but if left untreated over time it can put you at risk for heart attack, stroke, or kidney failure. You should have your blood pressure rechecked by a primary care provider within the next 4 weeks.  If your provider in the emergency department today gave you specific instructions to follow-up with your doctor or provider even sooner than that, you should follow that instruction and not wait for up to 4 weeks for your follow-up visit.        Thank you for choosing [facility]       Thank you for choosing [facility] for your care. Our goal is always to provide you with excellent care. Hearing back from our patients is one way we can continue to improve our services. Please take a few minutes to complete the written survey that you may receive in the mail after you visit with us. Thank you!        [facility] Information     [facility] lets you send messages to your doctor, view your test results, renew your prescriptions, schedule appointments and more. To sign up, go to [email or web address] . Click on \"Log in\" on the left side of the screen, which will take you to the Welcome page. Then click on \"Sign up Now\" on the right side of the page.     You will be asked to enter the access code listed below, as well as some personal information. Please follow the directions to create your username and password.     Your access code is: [email or web address]  Expires: " 2018 12:01 PM     Your access code will  in 90 days. If you need help or a new code, please call your [facility] clinic or [phone].        Care EveryWhere ID     This is your Care EveryWhere ID. This could be used by other organizations to access your [facility] medical records  [id number]        Equal Access to Services     KARTIK RANDALL : Vick Freeman, juwan roque, marcial portilloalsee shane, ana gaona . So [facility] [phone].    ATENCIÓN: Si habla español, tiene a dunlap disposición servicios gratuitos de asistencia lingüística. Llame al [phone].    We comply with applicable federal civil rights laws and [place] laws. We do not discriminate on the basis of race, color, national origin, age, disability, sex, sexual orientation, or gender identity.            After Visit Summary       This is your record. Keep this with you and show to your community pharmacist(s) and doctor(s) at your next visit.

## 2017-11-11 NOTE — LETTER
November 11, 2017      To Whom It May Concern:      Ziggy Lauren  was seen in our Emergency Department today, 11/11/17.  I expect his condition to improve over the next 3 days.  He may return to work/school when improved.    Sincerely,        Tara Bee RN

## 2017-11-11 NOTE — ED AVS SNAPSHOT
[facility] Emergency Department    [address]    [facility] [address]    Phone:  [phone]    Fax:  [phone]                                       Ziggy Lauren Sr.   MRN: [id number]    Department:  [facility] Emergency Department   Date of Visit:  11/11/2017           After Visit Summary Signature Page     I have received my discharge instructions, and my questions have been answered. I have discussed any challenges I see with this plan with the nurse or doctor.    ..........................................................................................................................................  Patient/Patient Representative Signature      ..........................................................................................................................................  Patient Representative Print Name and Relationship to Patient    ..................................................               ................................................  Date                                            Time    ..........................................................................................................................................  Reviewed by Signature/Title    ...................................................              ..............................................  Date                                                            Time

## 2017-11-11 NOTE — DISCHARGE INSTRUCTIONS
Watch for redness, drainage, fevers, swelling (sign of infection).    No dunking/swimming.  OK showering.    Stitches out in 5 days.    Bacitracin/neosporin to wound daily.    Inspect daily for signs of infection.    Please take your BP medicines today and recheck BP in the next few days.

## 2017-11-11 NOTE — ED PROVIDER NOTES
History     Chief Complaint:  Crushed finger    HPI   Ziggy Lauren Sr. is a 52 year old male with a history of liver fatty degeneration and CAD who presents to the emergency department with fatty liver degeneration  for evaluation of a crushed fingers. Approximately ten minutes ago, the patient reports he accidentally shut his car door on his finger. This prompted him to seek evaluation here in the emergency department. Here, the patient presents with his right index finger wrapped in gaze and is complaining of pain in the injured area as well as a laceration to the finger. The patient reports his Tdap is current.  No numbness.    Allergies:  NKDA     Medications:    Metformin  Lisinopril  Atorvastatin    Medical History:    CAD  Fatty liver generation  HTN  High cholesterol    Past Surgical History:    Abdomen surgery    Family History:    No past pertinent family history.    Social History:  Former smoker  Positive for alcohol use  Patient presents with his son  Marital Status:       Review of Systems   Skin: Positive for wound (Right index finger).   All other systems reviewed and are negative.  Patient is right handed.  He has not taken his BP medicines today.    Physical Exam   First Vitals:  BP: (!) 180/111  Pulse: 90  Temp: 98  F (36.7  C)  Resp: 18  Weight: 95.3 kg (210 lb)  SpO2: 99 %      Physical Exam   Musculoskeletal:        Hands:    GEN: patient smiling, no distress  RESPIRATORY: no tachypnea, breath sounds clear to auscultation  CVS: normal S1/S2, no murmurs/rubs/gallops  ABDOMEN: soft, nontender, no masses   EXTREMITIES: intact pulses x 2 (radial pulses intact), no edema  SKIN: warm and dry, see pictoral  NEURO:   Overall symmetrical exam  HEME: right index finger bruising on volar side, distal nailbed intact, 1.5cm laceration on right index finger volar pad (see pictoral).  Finger flex/ext normal against resistance.  FDP and FDS intact.  Finger adduction and abduction is normal.   Nailbed is normal.  Distal sensation intact.  No bony tenderness.  With flexion of fingers into palmar surface, the axis of the finger points into the palm appropriately.  LYMPH: no lymphadenopathy    Emergency Department Course   Imaging:  Radiographic findings were communicated with the patient who voiced understanding of the findings.    XR finger, right, G/E 3 views:   No evidence of fracture. Bony alignment within normal  limits. Subtle lucency within the soft tissues along the palmar aspect  of the index finger near the DIP joint probably represents soft tissue  injury. As per radiology.       Procedures:    Narrative: Procedure: Laceration Repair        LACERATION:  A simple clean 1.5 cm laceration.      LOCATION:  Distal volar aspect of right index finger       FUNCTION:  Distally sensation, circulation, motor and tendon function are intact.      ANESTHESIA:  Local using bupivacaine  total of 2 mLs (no epi)      PREPARATION:  Irrigation and Scrubbing with Normal Saline and Shur Clens  NS pressure irrigation with 250cc      DEBRIDEMENT:  no debridement      CLOSURE:  Wound was closed with One Layer.  Skin closed with 5 x 5.0 nylon using simple interrupted sutures.      Narrative:      Splint was applied to right index finger and after placement I checked and adjusted the fit to ensure proper positioning. Patient was more comfortable with splint in place. Sensation and circulation are intact after splint placement.      Emergency Department Course:  1041 Nursing notes and vitals reviewed.  I performed an exam of the patient as documented above.     Xray ordered.  Wiound irrigated    The patient was sent for a right finger xray while in the emergency department, findings above.     1141 I rechecked the patient and discussed the results of his workup thus far.     The patient has his finger laceration cleaned, repaired, and placed in a splint while here in the emergency department.     Findings and plan  explained to the Patient. Patient discharged home with instructions regarding supportive care, medications, and reasons to return. The importance of close follow-up was reviewed.     I personally answered all related questions prior to discharge.   Impression & Plan    Medical Decision Making:  Ziggy Lauren Sr. is a 52 year old male suffered a crushed laceration to his right index finger.  Laceration on the volar tip of the finger does not cross the DIP joint.   Xray was negative and CMS was intact including tendons and movement. The wound was repaired normally with no difficulty. He will go home to follow up with PCP so have stiches removed in 5-7 days.    Patient will take his BP medicines and recheck BP today.      Diagnosis:    ICD-10-CM    1. Laceration of right index finger without foreign body without damage to nail, initial encounter S61.210A    2. Hypertension, unspecified type I10        Disposition:  discharged to home    Instructions to patient:  Watch for redness, drainage, fevers, swelling (sign of infection).    No dunking/swimming.  OK showering.    Stitches out in 5 days.    Bacitracin/neosporin to wound daily.    Inspect daily for signs of infection.    Please take your BP medicines today and recheck BP in the next few days.    I, Carie Ellison, am serving as a scribe on 11/11/2017 at 10:40 AM to personally document services performed by Yumiko Brewer MD based on my observations and the provider's statements to me.     Carie Ellison  11/11/2017   [facility] EMERGENCY DEPARTMENT       Yumiko Brewer MD  11/11/17 5388       Yumiko Brewer MD  11/11/17 5666

## 2023-03-10 ENCOUNTER — HOSPITAL ENCOUNTER (EMERGENCY)
Facility: CLINIC | Age: 58
Discharge: HOME | End: 2023-03-10
Attending: EMERGENCY MEDICINE | Admitting: EMERGENCY MEDICINE
Payer: COMMERCIAL

## 2023-03-10 ENCOUNTER — APPOINTMENT (OUTPATIENT)
Dept: GENERAL RADIOLOGY | Facility: CLINIC | Age: 58
End: 2023-03-10
Attending: EMERGENCY MEDICINE
Payer: COMMERCIAL

## 2023-03-10 VITALS
HEIGHT: 64 IN | RESPIRATION RATE: 16 BRPM | TEMPERATURE: 97.8 F | SYSTOLIC BLOOD PRESSURE: 153 MMHG | HEART RATE: 70 BPM | WEIGHT: 190 LBS | DIASTOLIC BLOOD PRESSURE: 96 MMHG | BODY MASS INDEX: 32.44 KG/M2 | OXYGEN SATURATION: 97 %
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
## 2023-03-10 LAB
ANION GAP SERPL CALCULATED.3IONS-SCNC: 11 MMOL/L (ref 7–15)
ATRIAL RATE - MUSE: 73 BPM
BASOPHILS # BLD AUTO: 0 10E3/UL (ref 0–0.2)
BASOPHILS NFR BLD AUTO: 1 %
BUN SERPL-MCNC: 18.6 MG/DL (ref 8–23)
CALCIUM SERPL-MCNC: 9.8 MG/DL (ref 8.6–10)
CHLORIDE SERPL-SCNC: 103 MMOL/L (ref 98–107)
CREAT SERPL-MCNC: 0.95 MG/DL (ref 0.67–1.17)
DEPRECATED HCO3 PLAS-SCNC: 25 MMOL/L (ref 22–29)
DIASTOLIC BLOOD PRESSURE - MUSE: NORMAL MMHG
EOSINOPHIL # BLD AUTO: 0.1 10E3/UL (ref 0–0.7)
EOSINOPHIL NFR BLD AUTO: 1 %
ERYTHROCYTE [DISTWIDTH] IN BLOOD BY AUTOMATED COUNT: 12.7 % (ref 10–15)
ETHANOL SERPL-MCNC: 0.01 G/DL
GFR SERPL CREATININE-BSD FRML MDRD: >90 ML/MIN/1.73M2
GLUCOSE SERPL-MCNC: 127 MG/DL (ref 70–99)
HCT VFR BLD AUTO: 45.2 % (ref 40–53)
HGB BLD-MCNC: 15.3 G/DL (ref 13.3–17.7)
HOLD SPECIMEN: NORMAL
HOLD SPECIMEN: NORMAL
IMM GRANULOCYTES # BLD: 0 10E3/UL
IMM GRANULOCYTES NFR BLD: 0 %
INTERPRETATION ECG - MUSE: NORMAL
LYMPHOCYTES # BLD AUTO: 1.8 10E3/UL (ref 0.8–5.3)
LYMPHOCYTES NFR BLD AUTO: 20 %
MCH RBC QN AUTO: 31.1 PG (ref 26.5–33)
MCHC RBC AUTO-ENTMCNC: 33.8 G/DL (ref 31.5–36.5)
MCV RBC AUTO: 92 FL (ref 78–100)
MONOCYTES # BLD AUTO: 0.6 10E3/UL (ref 0–1.3)
MONOCYTES NFR BLD AUTO: 7 %
NEUTROPHILS # BLD AUTO: 6.2 10E3/UL (ref 1.6–8.3)
NEUTROPHILS NFR BLD AUTO: 71 %
NRBC # BLD AUTO: 0 10E3/UL
NRBC BLD AUTO-RTO: 0 /100
P AXIS - MUSE: 80 DEGREES
PLATELET # BLD AUTO: 132 10E3/UL (ref 150–450)
POTASSIUM SERPL-SCNC: 4 MMOL/L (ref 3.4–5.3)
PR INTERVAL - MUSE: 80 MS
QRS DURATION - MUSE: 180 MS
QT - MUSE: 496 MS
QTC - MUSE: 546 MS
R AXIS - MUSE: -55 DEGREES
RBC # BLD AUTO: 4.92 10E6/UL (ref 4.4–5.9)
SODIUM SERPL-SCNC: 139 MMOL/L (ref 136–145)
SYSTOLIC BLOOD PRESSURE - MUSE: NORMAL MMHG
T AXIS - MUSE: 154 DEGREES
TROPONIN T SERPL HS-MCNC: 20 NG/L
TSH SERPL DL<=0.005 MIU/L-ACNC: 0.87 UIU/ML (ref 0.3–4.2)
VENTRICULAR RATE- MUSE: 73 BPM
WBC # BLD AUTO: 8.8 10E3/UL (ref 4–11)

## 2023-03-10 PROCEDURE — 82077 ASSAY SPEC XCP UR&BREATH IA: CPT | Performed by: EMERGENCY MEDICINE

## 2023-03-10 PROCEDURE — 258N000003 HC RX IP 258 OP 636: Performed by: EMERGENCY MEDICINE

## 2023-03-10 PROCEDURE — 80048 BASIC METABOLIC PNL TOTAL CA: CPT | Performed by: EMERGENCY MEDICINE

## 2023-03-10 PROCEDURE — 85025 COMPLETE CBC W/AUTO DIFF WBC: CPT | Performed by: EMERGENCY MEDICINE

## 2023-03-10 PROCEDURE — 99285 EMERGENCY DEPT VISIT HI MDM: CPT | Mod: 25

## 2023-03-10 PROCEDURE — 73502 X-RAY EXAM HIP UNI 2-3 VIEWS: CPT

## 2023-03-10 PROCEDURE — 96360 HYDRATION IV INFUSION INIT: CPT

## 2023-03-10 PROCEDURE — 93005 ELECTROCARDIOGRAM TRACING: CPT

## 2023-03-10 PROCEDURE — 84443 ASSAY THYROID STIM HORMONE: CPT | Performed by: EMERGENCY MEDICINE

## 2023-03-10 PROCEDURE — 71046 X-RAY EXAM CHEST 2 VIEWS: CPT

## 2023-03-10 PROCEDURE — 36415 COLL VENOUS BLD VENIPUNCTURE: CPT | Performed by: EMERGENCY MEDICINE

## 2023-03-10 PROCEDURE — 84484 ASSAY OF TROPONIN QUANT: CPT | Performed by: EMERGENCY MEDICINE

## 2023-03-10 RX ADMIN — SODIUM CHLORIDE 1000 ML: 9 INJECTION, SOLUTION INTRAVENOUS at 10:49

## 2023-03-10 ASSESSMENT — ENCOUNTER SYMPTOMS
LIGHT-HEADEDNESS: 1
ARTHRALGIAS: 1
ABDOMINAL PAIN: 0

## 2023-03-10 ASSESSMENT — ACTIVITIES OF DAILY LIVING (ADL)
ADLS_ACUITY_SCORE: 35
ADLS_ACUITY_SCORE: 35

## 2023-03-10 NOTE — DISCHARGE INSTRUCTIONS
1. -Take acetaminophen 500 to 1000 mg by mouth every 4 to 6 hours as needed for pain or fever.  Do not take more than 4000 mg in 24 hours.  Do not take within 6 hours of another acetaminophen containing medication such as norco (vicodin) or percocet.  - Take ibuprofen 600 to 800 mg by mouth every 6 to 8 hours as needed for pain or fever  2.  Please return to the emergency department as needed for new or worsening symptoms including fainting, persistent or worsening chest pain or shortness of breath, vomiting and unable to keep anything down, black or bloody bowel movements, severe confusion, any other concerning symptoms.
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
